# Patient Record
Sex: FEMALE | Race: BLACK OR AFRICAN AMERICAN
[De-identification: names, ages, dates, MRNs, and addresses within clinical notes are randomized per-mention and may not be internally consistent; named-entity substitution may affect disease eponyms.]

---

## 2017-08-21 ENCOUNTER — HOSPITAL ENCOUNTER (OUTPATIENT)
Dept: HOSPITAL 35 - CV | Age: 67
End: 2017-08-21
Attending: NURSE PRACTITIONER
Payer: COMMERCIAL

## 2017-08-21 DIAGNOSIS — I07.1: ICD-10-CM

## 2017-08-21 DIAGNOSIS — I49.9: ICD-10-CM

## 2017-08-21 DIAGNOSIS — I50.30: Primary | ICD-10-CM

## 2017-08-31 ENCOUNTER — HOSPITAL ENCOUNTER (OUTPATIENT)
Dept: HOSPITAL 61 - PCVCCLINIC | Age: 67
Discharge: HOME | End: 2017-08-31
Attending: INTERNAL MEDICINE
Payer: COMMERCIAL

## 2017-08-31 DIAGNOSIS — R93.1: ICD-10-CM

## 2017-08-31 DIAGNOSIS — Z88.6: ICD-10-CM

## 2017-08-31 DIAGNOSIS — I10: Primary | ICD-10-CM

## 2017-08-31 DIAGNOSIS — Z79.82: ICD-10-CM

## 2017-08-31 DIAGNOSIS — I51.7: ICD-10-CM

## 2017-08-31 DIAGNOSIS — E78.5: ICD-10-CM

## 2017-08-31 DIAGNOSIS — Z90.710: ICD-10-CM

## 2017-08-31 PROCEDURE — 80061 LIPID PANEL: CPT

## 2017-08-31 PROCEDURE — G0463 HOSPITAL OUTPT CLINIC VISIT: HCPCS

## 2017-08-31 PROCEDURE — 93005 ELECTROCARDIOGRAM TRACING: CPT

## 2017-10-03 ENCOUNTER — HOSPITAL ENCOUNTER (OUTPATIENT)
Dept: HOSPITAL 61 - PCVCCLINIC | Age: 67
End: 2017-10-03
Attending: INTERNAL MEDICINE
Payer: COMMERCIAL

## 2017-10-03 DIAGNOSIS — E78.5: ICD-10-CM

## 2017-10-03 DIAGNOSIS — R00.2: ICD-10-CM

## 2017-10-03 DIAGNOSIS — I10: Primary | ICD-10-CM

## 2017-10-03 DIAGNOSIS — Z79.899: ICD-10-CM

## 2017-10-03 DIAGNOSIS — Z79.82: ICD-10-CM

## 2017-10-03 DIAGNOSIS — Z90.710: ICD-10-CM

## 2017-10-03 DIAGNOSIS — Z88.8: ICD-10-CM

## 2017-10-03 PROCEDURE — G0463 HOSPITAL OUTPT CLINIC VISIT: HCPCS

## 2017-10-24 ENCOUNTER — HOSPITAL ENCOUNTER (OUTPATIENT)
Dept: HOSPITAL 61 - PCVCIMAG | Age: 67
Discharge: HOME | End: 2017-10-24
Attending: INTERNAL MEDICINE
Payer: COMMERCIAL

## 2017-10-24 DIAGNOSIS — R00.2: ICD-10-CM

## 2017-10-24 DIAGNOSIS — K52.9: ICD-10-CM

## 2017-10-24 DIAGNOSIS — R94.31: ICD-10-CM

## 2017-10-24 DIAGNOSIS — I48.91: Primary | ICD-10-CM

## 2017-10-24 PROCEDURE — A9500 TC99M SESTAMIBI: HCPCS

## 2017-10-24 PROCEDURE — 78452 HT MUSCLE IMAGE SPECT MULT: CPT

## 2017-10-24 PROCEDURE — 93017 CV STRESS TEST TRACING ONLY: CPT

## 2018-09-06 ENCOUNTER — HOSPITAL ENCOUNTER (OUTPATIENT)
Dept: HOSPITAL 61 - PCVCCLINIC | Age: 68
Discharge: HOME | End: 2018-09-06
Attending: INTERNAL MEDICINE
Payer: COMMERCIAL

## 2018-09-06 DIAGNOSIS — I48.91: ICD-10-CM

## 2018-09-06 DIAGNOSIS — Z88.8: ICD-10-CM

## 2018-09-06 DIAGNOSIS — I10: ICD-10-CM

## 2018-09-06 DIAGNOSIS — R00.2: Primary | ICD-10-CM

## 2018-09-06 DIAGNOSIS — E78.5: ICD-10-CM

## 2018-09-06 DIAGNOSIS — Z79.899: ICD-10-CM

## 2018-09-06 PROCEDURE — G0463 HOSPITAL OUTPT CLINIC VISIT: HCPCS

## 2018-09-06 PROCEDURE — 93005 ELECTROCARDIOGRAM TRACING: CPT

## 2018-09-06 PROCEDURE — 80061 LIPID PANEL: CPT

## 2019-03-05 ENCOUNTER — HOSPITAL ENCOUNTER (EMERGENCY)
Dept: HOSPITAL 35 - ER | Age: 69
Discharge: HOME | End: 2019-03-05
Payer: COMMERCIAL

## 2019-03-05 VITALS — DIASTOLIC BLOOD PRESSURE: 66 MMHG | SYSTOLIC BLOOD PRESSURE: 97 MMHG

## 2019-03-05 VITALS — WEIGHT: 133 LBS | HEIGHT: 65 IN | BODY MASS INDEX: 22.16 KG/M2

## 2019-03-05 DIAGNOSIS — Z90.710: ICD-10-CM

## 2019-03-05 DIAGNOSIS — Z88.6: ICD-10-CM

## 2019-03-05 DIAGNOSIS — I10: ICD-10-CM

## 2019-03-05 DIAGNOSIS — B34.9: Primary | ICD-10-CM

## 2019-03-05 LAB
ANION GAP SERPL CALC-SCNC: 8 MMOL/L (ref 7–16)
BASOPHILS NFR BLD AUTO: 0 % (ref 0–2)
BUN SERPL-MCNC: 11 MG/DL (ref 7–18)
CALCIUM SERPL-MCNC: 9 MG/DL (ref 8.5–10.1)
CHLORIDE SERPL-SCNC: 102 MMOL/L (ref 98–107)
CO2 SERPL-SCNC: 29 MMOL/L (ref 21–32)
CREAT SERPL-MCNC: 1 MG/DL (ref 0.6–1)
EOSINOPHIL NFR BLD: 5 % (ref 0–3)
ERYTHROCYTE [DISTWIDTH] IN BLOOD BY AUTOMATED COUNT: 13.3 % (ref 10.5–14.5)
GLUCOSE SERPL-MCNC: 107 MG/DL (ref 74–106)
GRANULOCYTES NFR BLD MANUAL: 42 % (ref 36–66)
HCT VFR BLD CALC: 41.5 % (ref 37–47)
HGB BLD-MCNC: 14.2 GM/DL (ref 12–15)
LYMPHOCYTES NFR BLD AUTO: 43 % (ref 24–44)
MCH RBC QN AUTO: 28.7 PG (ref 26–34)
MCHC RBC AUTO-ENTMCNC: 34.3 G/DL (ref 28–37)
MCV RBC: 83.5 FL (ref 80–100)
MONOCYTES NFR BLD: 9 % (ref 1–8)
NEUTROPHILS # BLD: 1 THOU/UL (ref 1.4–8.2)
NEUTS BAND NFR BLD: 1 % (ref 0–8)
PLATELET # BLD EST: NORMAL 10*3/UL
PLATELET # BLD: 200 THOU/UL (ref 150–400)
POTASSIUM SERPL-SCNC: 3.3 MMOL/L (ref 3.5–5.1)
RBC # BLD AUTO: 4.97 MIL/UL (ref 4.2–5)
RBC MORPH BLD: NORMAL
SODIUM SERPL-SCNC: 139 MMOL/L (ref 136–145)
TROPONIN I SERPL-MCNC: <0.06 NG/ML (ref ?–0.06)
WBC # BLD AUTO: 2.3 THOU/UL (ref 4–11)

## 2019-06-04 ENCOUNTER — HOSPITAL ENCOUNTER (OUTPATIENT)
Dept: HOSPITAL 35 - BC | Age: 69
End: 2019-06-04
Attending: FAMILY MEDICINE
Payer: COMMERCIAL

## 2019-06-04 DIAGNOSIS — Z12.31: Primary | ICD-10-CM

## 2019-09-12 ENCOUNTER — HOSPITAL ENCOUNTER (OUTPATIENT)
Dept: HOSPITAL 61 - PCVCCLINIC | Age: 69
Discharge: HOME | End: 2019-09-12
Attending: INTERNAL MEDICINE
Payer: COMMERCIAL

## 2019-09-12 DIAGNOSIS — I48.0: Primary | ICD-10-CM

## 2019-09-12 DIAGNOSIS — E78.5: ICD-10-CM

## 2019-09-12 DIAGNOSIS — Z88.6: ICD-10-CM

## 2019-09-12 DIAGNOSIS — R00.2: ICD-10-CM

## 2019-09-12 DIAGNOSIS — Z79.899: ICD-10-CM

## 2019-09-12 DIAGNOSIS — I10: ICD-10-CM

## 2019-09-12 PROCEDURE — 93005 ELECTROCARDIOGRAM TRACING: CPT

## 2019-09-12 PROCEDURE — G0463 HOSPITAL OUTPT CLINIC VISIT: HCPCS

## 2019-09-16 ENCOUNTER — HOSPITAL ENCOUNTER (OUTPATIENT)
Dept: HOSPITAL 35 - GI | Age: 69
Discharge: HOME | End: 2019-09-16
Attending: INTERNAL MEDICINE
Payer: COMMERCIAL

## 2019-09-16 VITALS — BODY MASS INDEX: 22.82 KG/M2 | WEIGHT: 137 LBS | HEIGHT: 65 IN

## 2019-09-16 DIAGNOSIS — I10: ICD-10-CM

## 2019-09-16 DIAGNOSIS — Z12.11: Primary | ICD-10-CM

## 2019-09-16 DIAGNOSIS — D12.3: ICD-10-CM

## 2019-09-16 DIAGNOSIS — Z87.442: ICD-10-CM

## 2019-09-16 DIAGNOSIS — Z98.890: ICD-10-CM

## 2019-09-16 DIAGNOSIS — Z90.710: ICD-10-CM

## 2019-09-16 DIAGNOSIS — Z88.8: ICD-10-CM

## 2019-09-16 DIAGNOSIS — K63.5: ICD-10-CM

## 2019-09-16 DIAGNOSIS — Z86.010: ICD-10-CM

## 2019-09-16 DIAGNOSIS — Z79.899: ICD-10-CM

## 2019-09-16 PROCEDURE — 62110: CPT

## 2019-09-16 PROCEDURE — 62900: CPT

## 2019-09-19 ENCOUNTER — HOSPITAL ENCOUNTER (OUTPATIENT)
Dept: HOSPITAL 61 - PCVCIMAG | Age: 69
Discharge: HOME | End: 2019-09-19
Attending: INTERNAL MEDICINE
Payer: COMMERCIAL

## 2019-09-19 DIAGNOSIS — I08.8: Primary | ICD-10-CM

## 2019-09-19 DIAGNOSIS — I48.91: ICD-10-CM

## 2019-09-19 DIAGNOSIS — R00.2: ICD-10-CM

## 2019-09-19 PROCEDURE — 93306 TTE W/DOPPLER COMPLETE: CPT

## 2019-09-20 NOTE — PCVCIMAG
--------------- APPROVED REPORT --------------





Study performed:  09/19/2019 07:55:50



EXAM: Comprehensive 2D, Doppler, and color-flow 

Echocardiogram

Patient Location: Echo lab

Status:  routine



BSA:         1.68

HR: 71 bpmBP:          124/82 mmHg

Rhythm: NSR



Other Information 

Study Quality: Good



Risk Factors: 

Cardiac Risk Factors:  Hyperlipidemia, HTN



Indications

Atrial Fibrillation

Palpitations



2D Dimensions

IVSd:  10.95 (7-11mm)LVOT Diam:  19.00 (18-24mm) 

LVDd:  41.85 mm

PWd:  11.58 (7-11mm)Ascending Ao:  32.76 (22-36mm)

LVDs:  27.37 (25-40mm)

Left Atrium:  32.55 (27-40mm)

Aortic Root:  27.30 mm

LV Single Plane 4CH:  54.26 %

LV Single Plane 2CH:  56.35 %

Biplane EF:  56.8 %



Volumes

Left Atrial Volume (Systole)

Single Plane 4CH:  51.29 mLSingle Plane 2CH:  32.99 mL

LA ESV Index:  25.00 mL/m2



Aortic Valve

AoV Peak Sotero.:  1.37 m/s

AO Peak Gr.:  7.56 mmHgLVOT Max PG:  3.51 mmHg

LVOT Max V:  0.94 m/s

MIRANDA Vmax: 1.90 cm2



Mitral Valve

E/A Ratio:  0.9

MV Decel. Time:  239.44 ms

MV E Max Sotero.:  0.59 m/s

MV A Sotero.:  0.68 m/s

IVRT:  76.12 ms



TDI

E/Lateral E':  6.56E/Medial E':  7.38

Medial E' Sotero.:  0.08 m/s

Lateral E' Sotero.:  0.09 m/s



Pulmonary Valve

PV Peak Sotero.:  0.89 m/sPV Peak Gr.:  3.19 mmHg



Pulmonary Vein

P Vein S:    0.55 m/sP Vein A:  0.35 m/s

P Vein D:   0.51 m/sP Vein A Dur.:  117.6 msec

P Vein S/D Ratio:  1.08



Tricuspid Valve

TR Peak Sotero.:  2.55 m/sRAP Estimate:  7.00 mmHg

TR Peak Gr.:  26.05 mmHg

PA Pressure:  33.00 mmHg



Left Ventricle

The left ventricle is normal size. There is normal LV segmental wall 

motion. Borderline concentric left ventricular hypertrophy. Left 

ventricular systolic function is normal. The left ventricular 

ejection fraction is within the normal range. LVEF is 55-60%. Mild 

diastolic dysfunction is present (impaired relaxation 

pattern).



Right Ventricle

Right ventricle is mildly dilated. The right ventricular systolic 

function is normal.



Atria

The left atrium size is normal. Right atrium is dilated.



Aortic Valve

The aortic valve is normal in structure. No aortic regurgitation is 

present. There is no aortic valvular stenosis.



Mitral Valve

The mitral valve is normal in structure. Trace mitral regurgitation. 

No evidence of mitral valve stenosis.



Tricuspid Valve

The tricuspid valve is normal in structure. Mild tricuspid 

regurgitation. Pulmonary artery pressure is 33 mmHg.



Pulmonic Valve

The pulmonary valve is normal in structure. Mild pulmonic 

regurgitation.



Great Vessels

The aortic root is normal in size. The ascending aorta is normal in 

size. IVC is normal in size and collapses >50% with 

inspiration.



Pericardium

There is no pericardial effusion.



<Conclusion>

The left ventricle is normal size.

LVEF is 55-60%.

Right ventricle is mildly dilated.

Right atrium is dilated.

The mitral valve is normal in structure.

Trace mitral regurgitation.

The tricuspid valve is normal in structure.

Mild tricuspid regurgitation. Pulmonary artery pressure is 33 

mmHg.

The pulmonary valve is normal in structure.

Mild pulmonic regurgitation.

There is no pericardial effusion.

## 2020-01-11 ENCOUNTER — HOSPITAL ENCOUNTER (EMERGENCY)
Dept: HOSPITAL 35 - ER | Age: 70
LOS: 1 days | Discharge: HOME | End: 2020-01-12
Payer: COMMERCIAL

## 2020-01-11 VITALS — BODY MASS INDEX: 23.49 KG/M2 | WEIGHT: 141.01 LBS | HEIGHT: 65 IN

## 2020-01-11 DIAGNOSIS — Z88.6: ICD-10-CM
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
## 2020-01-11 DIAGNOSIS — Z90.710: ICD-10-CM
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
## 2020-01-11 DIAGNOSIS — S43.491A: Primary | ICD-10-CM

## 2020-01-11 DIAGNOSIS — I10: ICD-10-CM
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
## 2020-01-12 VITALS — SYSTOLIC BLOOD PRESSURE: 147 MMHG | DIASTOLIC BLOOD PRESSURE: 64 MMHG

## 2020-01-15 ENCOUNTER — HOSPITAL ENCOUNTER (OUTPATIENT)
Dept: HOSPITAL 35 - ULTRA | Age: 70
End: 2020-01-15
Attending: FAMILY MEDICINE
Payer: COMMERCIAL

## 2020-01-15 DIAGNOSIS — M79.621: ICD-10-CM

## 2020-01-15 DIAGNOSIS — M79.89: Primary | ICD-10-CM

## 2020-01-24 ENCOUNTER — HOSPITAL ENCOUNTER (OUTPATIENT)
Dept: HOSPITAL 35 - ULTRA | Age: 70
End: 2020-01-24
Attending: NURSE PRACTITIONER
Payer: COMMERCIAL

## 2020-01-24 DIAGNOSIS — M79.89: ICD-10-CM

## 2020-01-24 DIAGNOSIS — G54.0: Primary | ICD-10-CM

## 2020-06-11 ENCOUNTER — HOSPITAL ENCOUNTER (OUTPATIENT)
Dept: HOSPITAL 35 - RAD | Age: 70
End: 2020-06-11
Attending: FAMILY MEDICINE
Payer: COMMERCIAL

## 2020-06-11 DIAGNOSIS — Z12.31: Primary | ICD-10-CM

## 2020-06-23 ENCOUNTER — HOSPITAL ENCOUNTER (OUTPATIENT)
Dept: HOSPITAL 35 - BC | Age: 70
End: 2020-06-23
Attending: FAMILY MEDICINE
Payer: COMMERCIAL

## 2020-06-23 DIAGNOSIS — N63.20: ICD-10-CM

## 2020-06-23 DIAGNOSIS — N60.02: Primary | ICD-10-CM

## 2020-11-17 ENCOUNTER — HOSPITAL ENCOUNTER (EMERGENCY)
Dept: HOSPITAL 35 - ER | Age: 70
Discharge: HOME | End: 2020-11-17
Payer: COMMERCIAL

## 2020-11-17 VITALS — HEIGHT: 65 IN | WEIGHT: 136 LBS | BODY MASS INDEX: 22.66 KG/M2

## 2020-11-17 VITALS — DIASTOLIC BLOOD PRESSURE: 82 MMHG | SYSTOLIC BLOOD PRESSURE: 144 MMHG

## 2020-11-17 DIAGNOSIS — Z88.6: ICD-10-CM

## 2020-11-17 DIAGNOSIS — R42: Primary | ICD-10-CM

## 2020-11-17 DIAGNOSIS — Z90.710: ICD-10-CM

## 2020-11-17 DIAGNOSIS — I10: ICD-10-CM

## 2020-11-17 DIAGNOSIS — Z79.899: ICD-10-CM

## 2020-11-17 LAB
ALBUMIN SERPL-MCNC: 3.8 G/DL (ref 3.4–5)
ALT SERPL-CCNC: 15 U/L (ref 30–65)
ANION GAP SERPL CALC-SCNC: 3 MMOL/L (ref 7–16)
APTT BLD: 24.5 SECONDS (ref 24.5–32.8)
AST SERPL-CCNC: 16 U/L (ref 15–37)
BASOPHILS NFR BLD AUTO: 0.6 % (ref 0–2)
BILIRUB SERPL-MCNC: 0.3 MG/DL (ref 0.2–1)
BUN SERPL-MCNC: 14 MG/DL (ref 7–18)
CALCIUM SERPL-MCNC: 8.9 MG/DL (ref 8.5–10.1)
CHLORIDE SERPL-SCNC: 103 MMOL/L (ref 98–107)
CO2 SERPL-SCNC: 32 MMOL/L (ref 21–32)
CREAT SERPL-MCNC: 1 MG/DL (ref 0.6–1)
EOSINOPHIL NFR BLD: 0.6 % (ref 0–3)
ERYTHROCYTE [DISTWIDTH] IN BLOOD BY AUTOMATED COUNT: 13.6 % (ref 10.5–14.5)
GLUCOSE SERPL-MCNC: 110 MG/DL (ref 74–106)
GRANULOCYTES NFR BLD MANUAL: 72.8 % (ref 36–66)
HCT VFR BLD CALC: 40.7 % (ref 37–47)
HGB BLD-MCNC: 13.6 GM/DL (ref 12–15)
INR PPP: 1
LYMPHOCYTES NFR BLD AUTO: 21.6 % (ref 24–44)
MCH RBC QN AUTO: 28.8 PG (ref 26–34)
MCHC RBC AUTO-ENTMCNC: 33.4 G/DL (ref 28–37)
MCV RBC: 86.1 FL (ref 80–100)
MONOCYTES NFR BLD: 4.4 % (ref 1–8)
NEUTROPHILS # BLD: 3.3 THOU/UL (ref 1.4–8.2)
PLATELET # BLD: 218 THOU/UL (ref 150–400)
POTASSIUM SERPL-SCNC: 3.3 MMOL/L (ref 3.5–5.1)
PROT SERPL-MCNC: 8 G/DL (ref 6.4–8.2)
PROTHROMBIN TIME: 10.7 SECONDS (ref 9.3–11.4)
RBC # BLD AUTO: 4.72 MIL/UL (ref 4.2–5)
SODIUM SERPL-SCNC: 138 MMOL/L (ref 136–145)
TROPONIN I SERPL-MCNC: <0.06 NG/ML (ref ?–0.06)
WBC # BLD AUTO: 4.5 THOU/UL (ref 4–11)

## 2020-11-17 NOTE — EKG
[facility]
[address]
[place]   [address]                     ELECTROCARDIOGRAM REPORT      
_______________________________________________________________________________
 
Name:       TYRONE GRIGGS           Room #:                     PRE   
M.R.#:      [id number]                       Account #:      [id number]  
Admission:              Attend Phys:                          
Discharge:              Date of Birth:  [birth year]  
                                                          Report #: [id number]
                                                                    [id number]
_______________________________________________________________________________
THIS REPORT FOR:  
 
cc:  Fer Bangura James A. DO Santiago, Patrick MD [facility]                                         ~
THIS REPORT FOR:   //name//                          
 
                         [facility] ED
                                       
Test Date:    2020               Test Time:    15:04:47
Pat Name:     TYRONE GRIGGS             Department:   
Patient ID:   [id number]            Room:          
Gender:       F                        Technician:   
:          1950               Requested By: Brando Mortensen
Order Number: [id number] MD:   Hussein Holder
                                 Measurements
Intervals                              Axis          
Rate:         85                       P:            49
OR:           159                      QRS:          15
QRSD:         75                       T:            0
QT:           471                                    
QTc:          561                                    
                           Interpretive Statements
Sinus rhythm
Probable left atrial enlargement
Borderline T abnormalities, diffuse leads
Prolonged QT interval
Baseline wander in lead(s) V2
Compared to ECG 2019 08:23:56
Prolonged QT interval now present
T-wave abnormality still present
Electronically Signed On 2020 15:53:42 CST by Hussein Holder
[email or web address]
 
 
 
 
 
 
 
 
 
 
 
  <ELECTRONICALLY SIGNED>
   By: Hussein Holder MD, FACC    
  20     1553
D: 20 1504                           _____________________________________
T: 20 1504                           Hussein Holder MD, FACC      /EPI

## 2020-11-30 ENCOUNTER — HOSPITAL ENCOUNTER (INPATIENT)
Dept: HOSPITAL 35 - ER | Age: 70
LOS: 1 days | Discharge: HOME | DRG: 153 | End: 2020-12-01
Attending: HOSPITALIST | Admitting: HOSPITALIST
Payer: COMMERCIAL

## 2020-11-30 VITALS — DIASTOLIC BLOOD PRESSURE: 74 MMHG | SYSTOLIC BLOOD PRESSURE: 134 MMHG

## 2020-11-30 VITALS — SYSTOLIC BLOOD PRESSURE: 145 MMHG | DIASTOLIC BLOOD PRESSURE: 88 MMHG

## 2020-11-30 VITALS
WEIGHT: 136 LBS | SYSTOLIC BLOOD PRESSURE: 171 MMHG | BODY MASS INDEX: 22.66 KG/M2 | DIASTOLIC BLOOD PRESSURE: 92 MMHG | HEIGHT: 65 IN

## 2020-11-30 VITALS — DIASTOLIC BLOOD PRESSURE: 88 MMHG | SYSTOLIC BLOOD PRESSURE: 138 MMHG

## 2020-11-30 DIAGNOSIS — Z87.442: ICD-10-CM

## 2020-11-30 DIAGNOSIS — Z90.710: ICD-10-CM

## 2020-11-30 DIAGNOSIS — N39.0: ICD-10-CM

## 2020-11-30 DIAGNOSIS — I10: ICD-10-CM

## 2020-11-30 DIAGNOSIS — I48.91: ICD-10-CM

## 2020-11-30 DIAGNOSIS — H66.91: Primary | ICD-10-CM

## 2020-11-30 DIAGNOSIS — R27.8: ICD-10-CM

## 2020-11-30 DIAGNOSIS — E87.6: ICD-10-CM

## 2020-11-30 DIAGNOSIS — Z88.6: ICD-10-CM

## 2020-11-30 DIAGNOSIS — Z79.01: ICD-10-CM

## 2020-11-30 DIAGNOSIS — Z79.899: ICD-10-CM

## 2020-11-30 LAB
ALBUMIN SERPL-MCNC: 3.8 G/DL (ref 3.4–5)
ALT SERPL-CCNC: 20 U/L (ref 30–65)
ANION GAP SERPL CALC-SCNC: 10 MMOL/L (ref 7–16)
AST SERPL-CCNC: 17 U/L (ref 15–37)
BASOPHILS NFR BLD AUTO: 0.4 % (ref 0–2)
BILIRUB SERPL-MCNC: 0.5 MG/DL (ref 0.2–1)
BILIRUB UR-MCNC: NEGATIVE MG/DL
BUN SERPL-MCNC: 13 MG/DL (ref 7–18)
CALCIUM SERPL-MCNC: 8.7 MG/DL (ref 8.5–10.1)
CHLORIDE SERPL-SCNC: 101 MMOL/L (ref 98–107)
CO2 SERPL-SCNC: 26 MMOL/L (ref 21–32)
COLOR UR: YELLOW
CREAT SERPL-MCNC: 0.9 MG/DL (ref 0.6–1)
EOSINOPHIL NFR BLD: 1 % (ref 0–3)
ERYTHROCYTE [DISTWIDTH] IN BLOOD BY AUTOMATED COUNT: 13.4 % (ref 10.5–14.5)
GLUCOSE SERPL-MCNC: 164 MG/DL (ref 74–106)
GRANULOCYTES NFR BLD MANUAL: 64.9 % (ref 36–66)
HCT VFR BLD CALC: 41.5 % (ref 37–47)
HGB BLD-MCNC: 13.8 GM/DL (ref 12–15)
KETONES UR STRIP-MCNC: NEGATIVE MG/DL
LYMPHOCYTES NFR BLD AUTO: 29.7 % (ref 24–44)
MCH RBC QN AUTO: 28.9 PG (ref 26–34)
MCHC RBC AUTO-ENTMCNC: 33.4 G/DL (ref 28–37)
MCV RBC: 86.7 FL (ref 80–100)
MONOCYTES NFR BLD: 4 % (ref 1–8)
NEUTROPHILS # BLD: 4.8 THOU/UL (ref 1.4–8.2)
PLATELET # BLD: 278 THOU/UL (ref 150–400)
POTASSIUM SERPL-SCNC: 2.8 MMOL/L (ref 3.5–5.1)
PROT SERPL-MCNC: 8.3 G/DL (ref 6.4–8.2)
RBC # BLD AUTO: 4.79 MIL/UL (ref 4.2–5)
RBC # UR STRIP: NEGATIVE /UL
SODIUM SERPL-SCNC: 137 MMOL/L (ref 136–145)
SP GR UR STRIP: 1.01 (ref 1–1.03)
SQUAMOUS: (no result) /LPF (ref 0–3)
URINE CLARITY: CLEAR
URINE GLUCOSE-RANDOM*: NEGATIVE
URINE LEUKOCYTES-REFLEX: (no result)
URINE NITRITE-REFLEX: NEGATIVE
URINE PROTEIN (DIPSTICK): NEGATIVE
URINE WBC-REFLEX: (no result) /HPF (ref 0–5)
UROBILINOGEN UR STRIP-ACNC: 0.2 E.U./DL (ref 0.2–1)
WBC # BLD AUTO: 7.4 THOU/UL (ref 4–11)

## 2020-11-30 PROCEDURE — 10081 I&D PILONIDAL CYST COMP: CPT

## 2020-11-30 NOTE — HC
[facility]
[address]
[place]   [address]                     CONSULTATION                  
_______________________________________________________________________________
 
Name:       TYRONE GRIGGS           Room #:         216-P       [facility] [place]  
M.R.#:      [id number]                       Account #:      [id number]  
Admission:  11/30/20    Attend Phys:    Poncho Liu MD 
Discharge:  12/01/20    Date of Birth:  [birth year]  
                                                          Report #: [id number]
                                                                    [id number]   
_______________________________________________________________________________
THIS REPORT FOR:  
 
cc:  Fer Bangura James A. DO Khosla, Parveen K. MD                                         ~
 
 
DATE OF SERVICE:  12/01/2020
 
 
HISTORY OF PRESENT ILLNESS:  This is a 70-year-old female patient who was
evaluated by me for any neurological etiology for the patient's dizziness.  I
talked to the Emergency Room physician yesterday and in fact came to see this
patient in the Emergency Room, but I was told that they need to see her.  In any
event, this patient as I understand from the Emergency Room physician was there
a week ago, then came back, she was having dizziness.  She was having some
ambulation difficulty.  Some question of nystagmus was noticed in this patient,
but subsequently it was found that she does not have any nystagmus.  I talked to
the physical therapist.  We looked at this patient and she indicated that the
patient was able to walk reasonably well.  The patient is complaining of some
symptoms in the right ear.  She said she had similar symptoms about 3 years ago
and she had a pretty significant dizziness at that time and she also had some
ear pain.  As an evaluation, she had a CT angiogram of the head and neck.  CT
and MRI, they were reviewed and they were mostly unremarkable.
 
REVIEW OF SYSTEMS:  Positive for atrial fibrillation and she said she is on
anticoagulation for that.  She has a history of kidney stone, hysterectomy,
hypertension, colonoscopy.  That was a relevant 14-point review of system.
 
PAST MEDICAL HISTORY:  Positive for similar dizziness about 3 years ago
associated with ear infection.
 
FAMILY HISTORY:  Unremarkable.
 
SOCIAL HISTORY:  She drinks alcohol only on special occasions.
 
PHYSICAL EXAMINATION:
NEUROLOGIC:  Indicate that this patient is alert, responsive, able to follow
simple and complex command.  Her speech and concentration looks okay.  Cranial
nerve examination 2-12 looks unremarkable.  I did not see any nystagmus.
EXTREMITIES:  She moves all 4 extremities.  There is no loss of position sense. 
Reflexes are symmetrical.  There is no cerebellar sign.  There is no
papilledema.  There is no carotid bruit.  Her pulses are palpable.  She has no
edema.
CARDIAC:  Examination is difficult to tell because she says she has paroxysmal
atrial fibrillation.
 
 
 
[address]   [address]                     CONSULTATION                  
_______________________________________________________________________________
 
Name:       TYRONE GRIGGS \[facility]\""           Room #:         [address] [place] [address] [place]  
M.R.#:      [id number]                       Account #:      [id number]  
Admission:  11/30/20    Attend Phys:    Poncho Liu MD 
Discharge:  12/01/20    Date of Birth:  [birth year]  
                                                          Report #: [id number]
                                                                    [id number]   
_______________________________________________________________________________
 
 
GENERAL:  She is reasonably well-developed individual.  She does not have any
dysmorphic features of eyes, ears and face.
VITAL SIGNS:  Blood pressure is 135/76, respirations 18, pulse is 97,
temperature is 98.6.
 
LABORATORY DATA:  White count is 7.4.  Potassium was low, but it is better now.
 
Imaging studies were reviewed and summarized above.
 
IMPRESSION:  It is unlikely there is any neurological etiology for the patient's
dizziness.  I do not think any further neurological workup is needed.  I will
suggest getting an ENT consult.  I will also suggest working and management of
systemic problem.  We will sign off.  Please let me know if further followup is
needed.
 
More than 50 minutes of time was spent taking care of this patient today and
majority was spent counseling and coordinating.
 
 
 
 
 
 
 
 
 
 
 
 
 
 
 
 
 
 
 
 
 
 
 
 
 
                         
   By:                               
                   
D: 12/01/20 1207                           _____________________________________
T: 12/01/20 2208                           Thomas Quintero MD           /nt

## 2020-12-01 VITALS — DIASTOLIC BLOOD PRESSURE: 65 MMHG | SYSTOLIC BLOOD PRESSURE: 120 MMHG

## 2020-12-01 VITALS — SYSTOLIC BLOOD PRESSURE: 139 MMHG | DIASTOLIC BLOOD PRESSURE: 83 MMHG

## 2020-12-01 VITALS — DIASTOLIC BLOOD PRESSURE: 65 MMHG | SYSTOLIC BLOOD PRESSURE: 132 MMHG

## 2020-12-01 VITALS — SYSTOLIC BLOOD PRESSURE: 135 MMHG | DIASTOLIC BLOOD PRESSURE: 76 MMHG

## 2020-12-01 LAB
ANION GAP SERPL CALC-SCNC: 12 MMOL/L (ref 7–16)
BUN SERPL-MCNC: 13 MG/DL (ref 7–18)
CALCIUM SERPL-MCNC: 9.1 MG/DL (ref 8.5–10.1)
CHLORIDE SERPL-SCNC: 104 MMOL/L (ref 98–107)
CHOLEST SERPL-MCNC: 219 MG/DL (ref ?–200)
CO2 SERPL-SCNC: 23 MMOL/L (ref 21–32)
CREAT SERPL-MCNC: 0.9 MG/DL (ref 0.6–1)
GLUCOSE SERPL-MCNC: 141 MG/DL (ref 74–106)
HDLC SERPL-MCNC: 95 MG/DL (ref 40–?)
LDLC SERPL-MCNC: 117 MG/DL (ref ?–100)
MAGNESIUM SERPL-MCNC: 2.3 MG/DL (ref 1.8–2.4)
POTASSIUM SERPL-SCNC: 4.6 MMOL/L (ref 3.5–5.1)
SODIUM SERPL-SCNC: 139 MMOL/L (ref 136–145)
TC:HDL: 2.3 RATIO
TRIGL SERPL-MCNC: 35 MG/DL (ref ?–150)
VLDLC SERPL CALC-MCNC: 7 MG/DL (ref ?–40)

## 2020-12-01 NOTE — NUR
ASSUMED CARE AT [facility] CHANGE, ALERT AND ORIENTED 4, AND VSS. ASSESSMENT AS
CHARTED, C/O OF HEADACHE AND DIZZINESS. DR RAE NOTIFIED. AND WILL
CONTINUE WITH POC.

## 2020-12-01 NOTE — EKG
[facility]
[address]
[place]   [address]                     ELECTROCARDIOGRAM REPORT      
_______________________________________________________________________________
 
Name:       TYRONE GRIGGS           Room #:         216-P       [place]  
M.R.#:      [id number]                       Account #:      [id number]  
Admission:  20    Attend Phys:    Poncho Liu MD 
Discharge:              Date of Birth:  [birth year]  
                                                          Report #: [id number]
                                                                    [id number]
_______________________________________________________________________________
THIS REPORT FOR:  
 
cc:  Fer Bangura James A. DO Santiago, Patrick MD [facility]                                         ~
THIS REPORT FOR:   //name//                          
 
                         [facility] ED
                                       
Test Date:    2020               Test Time:    19:28:33
Pat Name:     TYRONE GRIGGS             Department:   
Patient ID:   [id number]            Room:         216
Gender:       F                        Technician:   JO ALICEA
:          1950               Requested By: Jef Mosqueda
Order Number: [id number] MD:   Hussein Holder
                                 Measurements
Intervals                              Axis          
Rate:         80                       P:            60
AK:           159                      QRS:          17
QRSD:         87                       T:            1
QT:           381                                    
QTc:          440                                    
                           Interpretive Statements
Sinus rhythm
Multiple ventricular premature complexes
Left atrial enlargement
Probable left ventricular hypertrophy
Compared to ECG 2020 15:04:47
Ventricular premature complex(es) now present
T-wave abnormality no longer present
Prolonged QT interval no longer present
Electronically Signed On 2020 7:25:17 CST by Hussein Holder
[email or web address]
 
 
 
 
 
 
 
 
 
 
 
  <ELECTRONICALLY SIGNED>
   By: Hussein Holder MD, [facility]    
  20     0725
D: 20                           _____________________________________
T: 20                           Hussein Holder MD, [facility]      /EPI

## 2020-12-01 NOTE — NUR
PT NEW ADMIT AS OF LAST NIGHT.  ALERT AND ORIENTED. VITALS STABLE.  DENIES
NAUSEA VOMITING DIARRHEA OR CHEST PAIN.  PT C/O OF MILD DIZZINESS BUT " NOT
LIKE I FELT EARLIER, ROOM SPINNING OR SUCH.".  ASSESSMENTS AS DOCUMENTED.  NO
C/O SOB.  RESTING THROUGH THE NIGHT AND POSSIBLE MRI THIS AM.  ADMIT NIH SCORE
OF ZERO.  DENIES VISUAL CHANGES.  WILL CONTINUE TO MONITOR AND FOLLOW POC.

## 2020-12-02 LAB
EST. AVERAGE GLUCOSE BLD GHB EST-MCNC: 108 MG/DL
GLYCOHEMOGLOBIN (HGB A1C): 5.4 % (ref 4.8–5.6)

## 2020-12-06 ENCOUNTER — HOSPITAL ENCOUNTER (EMERGENCY)
Dept: HOSPITAL 35 - ER | Age: 70
Discharge: HOME | End: 2020-12-06
Payer: COMMERCIAL

## 2020-12-06 VITALS — DIASTOLIC BLOOD PRESSURE: 76 MMHG | SYSTOLIC BLOOD PRESSURE: 142 MMHG

## 2020-12-06 VITALS — BODY MASS INDEX: 23.32 KG/M2 | WEIGHT: 139.99 LBS | HEIGHT: 65 IN

## 2020-12-06 DIAGNOSIS — R42: Primary | ICD-10-CM

## 2020-12-06 DIAGNOSIS — I10: ICD-10-CM

## 2020-12-06 DIAGNOSIS — Z79.899: ICD-10-CM

## 2020-12-06 DIAGNOSIS — Z90.710: ICD-10-CM

## 2020-12-06 DIAGNOSIS — Z88.6: ICD-10-CM

## 2020-12-11 ENCOUNTER — HOSPITAL ENCOUNTER (OUTPATIENT)
Dept: HOSPITAL 35 - SJCVC | Age: 70
End: 2020-12-11
Attending: INTERNAL MEDICINE
Payer: COMMERCIAL

## 2020-12-11 DIAGNOSIS — Z79.82: ICD-10-CM

## 2020-12-11 DIAGNOSIS — E78.5: ICD-10-CM

## 2020-12-11 DIAGNOSIS — Z86.73: ICD-10-CM

## 2020-12-11 DIAGNOSIS — I48.0: ICD-10-CM

## 2020-12-11 DIAGNOSIS — R42: Primary | ICD-10-CM

## 2020-12-11 DIAGNOSIS — Z79.899: ICD-10-CM

## 2020-12-11 DIAGNOSIS — I10: ICD-10-CM

## 2021-06-28 ENCOUNTER — HOSPITAL ENCOUNTER (OUTPATIENT)
Dept: HOSPITAL 35 - GI | Age: 71
Discharge: HOME | End: 2021-06-28
Attending: INTERNAL MEDICINE
Payer: COMMERCIAL

## 2021-06-28 VITALS — HEIGHT: 65 IN | BODY MASS INDEX: 23.16 KG/M2 | WEIGHT: 139 LBS

## 2021-06-28 DIAGNOSIS — Z88.8: ICD-10-CM

## 2021-06-28 DIAGNOSIS — I48.91: ICD-10-CM

## 2021-06-28 DIAGNOSIS — Z87.442: ICD-10-CM

## 2021-06-28 DIAGNOSIS — Z90.710: ICD-10-CM

## 2021-06-28 DIAGNOSIS — K29.50: ICD-10-CM

## 2021-06-28 DIAGNOSIS — Z79.899: ICD-10-CM

## 2021-06-28 DIAGNOSIS — Z79.01: ICD-10-CM

## 2021-06-28 DIAGNOSIS — Z98.890: ICD-10-CM

## 2021-06-28 DIAGNOSIS — K57.10: ICD-10-CM

## 2021-06-28 DIAGNOSIS — R11.0: ICD-10-CM

## 2021-06-28 DIAGNOSIS — K31.9: ICD-10-CM

## 2021-06-28 DIAGNOSIS — Z87.19: ICD-10-CM

## 2021-06-28 DIAGNOSIS — R10.13: Primary | ICD-10-CM

## 2021-06-28 DIAGNOSIS — I10: ICD-10-CM

## 2021-06-28 PROCEDURE — 62900: CPT

## 2021-06-28 PROCEDURE — 62110: CPT

## 2021-07-01 NOTE — PATH
[facility]
[address]
[place]   [address]                     PATHOLOGY RPT PROCEDURE       
_______________________________________________________________________________
 
Name:       LISANDRA GRIGGS           Room #:                     REG NICOLE CAMARA.#:      [id number]     Account #:      [id number]  
Admission:  06/28/21    Date of Birth:  [birth year]  
Discharge:                                              Report #:    [id number]
                                                        Path Case #: [id number]
_______________________________________________________________________________
 
[facility] Accession Number: [id number]
.                                                                01
Material submitted:                                        .
gastrointestinal site - RANDOM GASTRIC RULE OUT H PYLORI
.                                                                01
Clinical history:                                          .
EGD
ABDOMINAL PAIN/EPIGASTRIC PAIN
NORMAL EGD
.                                                                02
**********************************************************************
Diagnosis:
Gastric mucosa, random gastric, endoscopic biopsy:
- Mild chronic gastritis with features of reactive gastropathy.
- Negative for intestinal metaplasia or atrophy.
- Negative for Helicobacter pylori (properly controlled
immunohistochemical stain performed).
(IUV:pit; 07/01/2021)
.
P  07/01/2021  1508 Local
**********************************************************************
.                                                                02
Electronically signed:                                     .
Natalya Canchola MD, Pathologist
NPI- [id number]
.                                                                01
Gross description:                                         .
The specimen is received in formalin, labeled "Lisandra Griggs, random
gastric".  Received are multiple segments of pale tan tissue ranging in
size from 0.3-0.6 cm in maximum dimensions.  The specimen is submitted
entirely in cassette A1.
(CAA; 6/30/2021)
QA/QA  06/30/2021  1331 Local
.                                                                02
Pathologist provided ICD-10:
K29.50
.                                                                02
CPT                                                        .
616356, M19303
Specimen Comment: A courtesy copy of this report has been sent to [phone],
[phone]
Specimen Comment: [id number]
Specimen Comment: Report sent to  / DR JOHNSON
***Performed at:  01
   [facility]
   [address] [facility] [address], [place]  [id number]
   MD Jeet Lemus MD Phone:  [phone]
 
 
[address]   [address]                     PATHOLOGY RPT PROCEDURE       
_______________________________________________________________________________
 
Name:       LISANDRA GRIGGS           Room #:                     [facility].#:      [id number]     Account #:      [id number]  
Admission:  06/28/21    Date of Birth:  [birth year]  
Discharge:                                              Report #:    [id number]
                                                        Path Case #: [id number]
_______________________________________________________________________________
***Performed at:  02
   [address]  [id number]
   MD Natalya Canchola MD Phone:  [phone]

## 2021-09-20 ENCOUNTER — HOSPITAL ENCOUNTER (OUTPATIENT)
Dept: HOSPITAL 35 - BC | Age: 71
End: 2021-09-20
Attending: NURSE PRACTITIONER
Payer: COMMERCIAL

## 2021-09-20 DIAGNOSIS — N64.89: ICD-10-CM

## 2021-09-20 DIAGNOSIS — R22.1: ICD-10-CM

## 2021-09-20 DIAGNOSIS — Z12.31: Primary | ICD-10-CM
